# Patient Record
Sex: MALE | URBAN - METROPOLITAN AREA
[De-identification: names, ages, dates, MRNs, and addresses within clinical notes are randomized per-mention and may not be internally consistent; named-entity substitution may affect disease eponyms.]

---

## 2023-04-17 ENCOUNTER — PROCEDURE VISIT (OUTPATIENT)
Dept: SPORTS MEDICINE | Age: 18
End: 2023-04-17

## 2023-04-17 DIAGNOSIS — S93.402A MILD SPRAIN OF LEFT ANKLE, INITIAL ENCOUNTER: Primary | ICD-10-CM

## 2023-04-17 NOTE — PROGRESS NOTES
Irregular   Volume [x] Adequate  [] Shallow [] Deep  Effort  [] Labored [x] Unlabored  Skin:  Color  [x] Normal [] Pale [] Cyanotic    Temperature [] Hot   [x] Warm [] Cool  [] Cold     Moisture [] Dry  [x] Moist [] Warm    Psychiatric:   [x] Good judgement and insight. [x] Oriented to [x] person, [x] place, and [x] time. [x] Mood appropriate for circumstances. Gait & Station:   Gait:    [] Normal  [x] Antalgic  [] Trendelenburg  [] Steppage  [] Wide  [] Unsteady   Foot:   [x] Neutral  [] Pronated  [] Supinated  Foot Type:  [x] Neutral  [] Pes Planus  [] Pes Cavus  Assistive Device: [x] None  [] Brace  [] Cane  [] Crutches  [] Alda Downs  [] Wheelchair  [] Other:   Inspection:   Skin:   [x] Intact [] Abrasion  [] Laceration  Notes:   Ecchymosis:  [x] None [] Mild  [] Moderate  [] Severe  Notes:   Atrophy:  [x] None [] Mild  [] Moderate  [] Severe  Notes:   Effusion:  [] None [x] Mild  [] Moderate  [] Severe  Notes: around the lateral malleolus, especially over the ATFL. Deformity:  [x] None [] Mild  [] Moderate  [] Severe  Notes:   Scar / Surgical incision(s): [] A-Scope Portals  [] Open Surgical Incision(s)  Notes: None   Joint Hypertrophy:  Notes: None  Alignment:   [x] Alignment was not assessed   Normal Measured Findings/Notes Passively Correctable to Normal   Patella Q-Angle []  []   Valgus Alignment []  []   Varus Alignment []  []   Pelvis Alignment []  []   Leg Length []  []    []  []   Orthopaedic Exam: Left Ankle  Palpation:   Tenderness: [] None  [] Mild [] Moderate [] Severe   at: Anterior Talofibular Ligament and Deltoid Ligament  Crepitation: [x] None  [] Mild [] Moderate [] Severe   at: N/A  Effusion: [] None  [] Mild [x] Moderate [] Severe   at:  Anterior Talofibular Ligament  Posterior Pedal Pulse:  [] Not assessed [] Not Detected [x] Detected  Dorsalis Pedal Pulse: [] Not assessed [] Not Detected [x] Detected  Deformity:   Range of Motion: (Not assessed if not marked)  [x] Normal Flexibility /

## 2023-04-24 ENCOUNTER — PROCEDURE VISIT (OUTPATIENT)
Dept: SPORTS MEDICINE | Age: 18
End: 2023-04-24

## 2023-04-24 DIAGNOSIS — S93.402D MILD SPRAIN OF LEFT ANKLE, SUBSEQUENT ENCOUNTER: Primary | ICD-10-CM

## 2023-04-24 NOTE — PROGRESS NOTES
Athletic Training  Date of Report: 2023  Name: Daryl Kraus: [facility]  Sport: Soccer  : 2005  Age: 21 y.o. MRN: <[id number]>  Encounter:  [] New AT Eval     [x] Follow-Up Visit    [] Other:   SUBJECTIVE:  Reason for Visit:    Chief Complaint   Patient presents with    Ankle Pain     Doroteo Quan is a 21y.o. year old  male who presents today to continue therapeutic exercises for his left ankle. Nadira Leal has been pain free for the last 3 days. He notes that he has not tried running, lifting, or any other physical activity since I saw him Thursday. He admits he has not been completing his HEP regularly, although he has been working on AROM while seated. Nadira Leal has soccer practice Tuesday-Thursday this week, as well as an alumni game on Saturday. OBJECTIVE:   Physical Exam  Not point tender. No ecchymosis or swelling. A/PROM wnl without pain. RROM 5/5 without pain. Negative anterior drawer. ASSESSMENT:   Diagnosis Orders   1. Mild sprain of left ankle, subsequent encounter            Clinical Impression: Contusion of the deltoid ligament (resolved) and Inversion Ankle Sprain  Status: Limited Restrictions: No scrimmage/live play    PLAN:  Nadira Leal completed the following exercises:     4 Way ankle 3x10  Standing BAPS x8 each direction  SLB Rebounder 2x15  SL Calf Raises 2x15  Wall Jumps 2x10  SL Hops 2x10 each  Straight Line Running  Zig Zag Drill  Box Drill   Nadira Leal was able to successfully complete all exercises without issue. He does have some weakness with SL lateral hops and planting and cutting off the left foot. I have updated his HEP and encouraged him to complete it daily. Nadira Leal reports his practices mostly consist of scrimmage only. I do not feel he is quite ready for scrimmage. He is going to do his own agility workout at the field during practice time.  Nadira Leal will reach out via [employer] regarding what he was able to tolerate - specifically, how long it took for his ankle to

## 2023-04-27 ENCOUNTER — PROCEDURE VISIT (OUTPATIENT)
Dept: SPORTS MEDICINE | Age: 18
End: 2023-04-27

## 2023-04-27 DIAGNOSIS — S93.402D MILD SPRAIN OF LEFT ANKLE, SUBSEQUENT ENCOUNTER: Primary | ICD-10-CM

## 2023-04-27 NOTE — PROGRESS NOTES
Athletic Training  Date of Report: 2023  Name: Lendon Brunner: [facility]  Sport: Soccer  : 2005  Age: 21 y.o. MRN: <[id number]>  Encounter:  [] New AT Eval     [x] Follow-Up Visit    [] Other:   SUBJECTIVE:  Reason for Visit:    Chief Complaint   Patient presents with    Ankle Pain     Jimenez Monroy is a 21y.o. year old  male who presents today to continue therapeutic exercises for his left ankle. Enma Nava no longer experiences any ankle pain. He practiced yesterday for an hour without issue. He notes it was mostly scrimmage. He has not been completing his HEP daily, but notes he plans to start. Enma Nava has soccer practice this weekend, as well as an alumni game on Saturday. OBJECTIVE:   Physical Exam  No change. ASSESSMENT:   Diagnosis Orders   1. Mild sprain of left ankle, subsequent encounter              Clinical Impression: Contusion of the deltoid ligament (resolved) and Inversion Ankle Sprain resolving  Status: Limited Restrictions: No scrimmage/live play    PLAN:  Angel completed the following exercises:    4 Way ankle 3x10  Standing BAPS x8 each direction  SLB Rebounder 2x15  SL Calf Raises 2x15  Wall Jumps 2x10  SL Hops 2x10 each  180 Squat Jumps 2x10  Straight Line Running  Zig Zag Drill  Box Drill  Enma Nava was able to successfully complete all exercises without issue. Lateral hops no longer felt weak. I have updated his HEP and encouraged him to complete it daily, at least for the next 2 weeks. Cleared for full RTP as tolerated. Enma Nava is discharged from my care. Follow up in ATR prn if symptoms return. Follow-Up Care / Instructions:    Discharged: Yes  Electronically Signed By: Bess Rahman, ATR, LAT, ATC

## 2024-04-20 ENCOUNTER — PROCEDURE VISIT (OUTPATIENT)
Dept: SPORTS MEDICINE | Age: 19
End: 2024-04-20

## 2024-04-20 DIAGNOSIS — M25.571 ACUTE RIGHT ANKLE PAIN: Primary | ICD-10-CM

## 2024-04-20 NOTE — PROGRESS NOTES
Unlabored  Skin:  Color  [x] Normal [] Pale - when he first came in, but this resolved before he left [] Cyanotic    Temperature [] Hot   [x] Warm [] Cool  [] Cold     Moisture [] Dry  [x] Moist [] Warm    Psychiatric:   [x] Good judgement and insight.  [x] Oriented to [x] person, [x] place, and [x] time.  [x] Mood appropriate for circumstances.  Gait & Station:   Gait:    [] Normal  [x] Antalgic - unable to bear any weight  [] Trendelenburg  [] Steppage  [] Wide  [] Unsteady   Foot:   [x] Neutral  [] Pronated  [] Supinated  Foot Type:  [x] Neutral  [] Pes Planus  [] Pes Cavus  Assistive Device: [x] None  [] Brace  [] Cane  [] Crutches  [] Walker  [] Wheelchair  [] Other:   Inspection:   Skin:   [] Intact [x] Abrasion  [] Laceration  Notes: very small abrasion over lateral malleolus  Ecchymosis:  [x] None [] Mild  [] Moderate  [] Severe  Notes:   Atrophy:  [x] None [] Mild  [] Moderate  [] Severe  Notes:   Effusion:  [] None [x] Mild  [] Moderate  [] Severe  Notes: Over lateral malleolus  Deformity:  [x] None [] Mild  [] Moderate  [] Severe  Notes:   Scar / Surgical incision(s): [] A-Scope Portals  [] Open Surgical Incision(s)  Notes: None  Joint Hypertrophy:  Notes: None  Alignment:   [x] Alignment was not assessed   Normal Measured Findings/Notes Passively Correctable to Normal   Patella Q-Angle []  []   Valgus Alignment []  []   Varus Alignment []  []   Pelvis Alignment []  []   Leg Length []  []    []  []   Orthopaedic Exam: Right Ankle  Palpation:   Tenderness: [] None  [] Mild [] Moderate [x] Severe   at: Lateral Malleolus , Anterior Tibiofibular Ligament, and Posterior Talofibular Ligament  Crepitation: [x] None  [] Mild [] Moderate [] Severe   at: N/A  Effusion: [] None  [x] Mild [] Moderate [] Severe   at: Lateral Malleolus   Posterior Pedal Pulse:  [] Not assessed [] Not Detected [x] Detected  Dorsalis Pedal Pulse: [] Not assessed [] Not Detected [x] Detected  Deformity: None  Range of Motion: (Not

## 2024-04-22 ENCOUNTER — OFFICE VISIT (OUTPATIENT)
Dept: ORTHOPEDIC SURGERY | Age: 19
End: 2024-04-22
Payer: COMMERCIAL

## 2024-04-22 VITALS — BODY MASS INDEX: 21.14 KG/M2 | HEIGHT: 75 IN | WEIGHT: 170 LBS

## 2024-04-22 DIAGNOSIS — M25.571 ACUTE RIGHT ANKLE PAIN: Primary | ICD-10-CM

## 2024-04-22 PROCEDURE — G8420 CALC BMI NORM PARAMETERS: HCPCS | Performed by: EMERGENCY MEDICINE

## 2024-04-22 PROCEDURE — L4361 PNEUMA/VAC WALK BOOT PRE OTS: HCPCS | Performed by: EMERGENCY MEDICINE

## 2024-04-22 PROCEDURE — 99203 OFFICE O/P NEW LOW 30 MIN: CPT | Performed by: EMERGENCY MEDICINE

## 2024-04-22 PROCEDURE — G8427 DOCREV CUR MEDS BY ELIG CLIN: HCPCS | Performed by: EMERGENCY MEDICINE

## 2024-04-22 PROCEDURE — 1036F TOBACCO NON-USER: CPT | Performed by: EMERGENCY MEDICINE

## 2024-04-22 ASSESSMENT — ENCOUNTER SYMPTOMS
ABDOMINAL PAIN: 0
SHORTNESS OF BREATH: 0

## 2024-04-22 NOTE — ASSESSMENT & PLAN NOTE
Patient is seen and examined in the clinic today.  He is presenting with acute right ankle pain after an inversion injury while playing soccer 2 days ago.  He presents today on crutches, nonweightbearing.    On exam, he has tenderness to palpation over the ATFL with pain with anterior drawer and talar tilt but no significant laxity.  Limited range of motion when compared to the contralateral side.    X-ray negative for acute osseous abnormality.    I discussion with the patient regarding potential workup and treatment options.  Clinical presentation seems most likely consistent with acute ankle sprain.  I have low suspicion for high ankle sprain or syndesmotic injury.  Achilles tendon intact.  We discussed the option of adding on an MRI for definitive diagnosis.  However, patient declined.  Instead, we discussed high tide walking boot, weightbearing as tolerated.  Ice, Tylenol, ibuprofen for symptomatic management.  I would like for him to get out of the boot several times a day for gentle range of motion exercises.  I will see him back in 3 weeks.  At this time, I will likely get him into physical therapy.  If he has no improvement of his symptoms at the 3-week visit, I would encourage adding on an MRI.      In 3 weeks, this is the patient's last week here at [facility] prior to summer break.  I did encourage the patient to get a follow-up appointment with an orthopedist at home.

## 2024-04-22 NOTE — PROGRESS NOTES
NEW PATIENT VISIT  CC: Ankle Pain (RIGHT ANKLE)    Referring Provider: [facility] ATC    HPI:    Angel Marie is a 18 y.o. male who presents for evaluation of right ankle pain.  Patient was playing soccer on 4/20/2024 when he sustained an inversion injury to the right ankle.  He states that he picked up a lot of swelling and has had decreased range of motion.  He states that he was placed on crutches.  Pain is worse with ambulation and with weightbearing.  He reports multiple prior ankle sprains.  He is right foot dominant.  He denies any numbness or tingling.  No proximal tib-fib pain.  He reports that the pain is mostly located laterally.  He denies any other complaints.    History reviewed. No pertinent past medical history.    Social History  Plays club soccer at [facility]    Medications  No current outpatient medications on file.     No current facility-administered medications for this visit.       Allergies  No Known Allergies    Review of Systems:  Review of Systems   Constitutional:  Negative for activity change.   Respiratory:  Negative for shortness of breath.    Cardiovascular:  Negative for chest pain.   Gastrointestinal:  Negative for abdominal pain.   Musculoskeletal:  Positive for joint swelling (right ankle). Negative for myalgias.        Right ankle pain   Skin:  Negative for rash.   Allergic/Immunologic: Negative for immunocompromised state.   Neurological:  Negative for numbness.       Physical Examination:  General: Well appearing male, in no acute distress  Respiratory: Normal respiratory effort  Cardiovascular: No visual or palpable edema  Skin: no identified rashes, no induration, erythema or cyanosis  Neurologic: Light touch sensation is intact, no allodynia or hyperalgesia  Gait: Ambulatory on crutches, nonweightbearing on the right lower extremity  Extremities: No evidence of clubbing, cyanosis, tenosynovitis or nail pitting  MSK: Right ankle  Inspection/Palpation: Soft tissue

## 2024-05-08 ENCOUNTER — PROCEDURE VISIT (OUTPATIENT)
Dept: SPORTS MEDICINE | Age: 19
End: 2024-05-08

## 2024-05-08 DIAGNOSIS — M25.571 ACUTE RIGHT ANKLE PAIN: Primary | ICD-10-CM

## 2024-05-08 NOTE — PROGRESS NOTES
Athletic Training  Date of Report: 2024  Name: Angel Marie  School: [employer]  Sport: Soccer  : 2005  Age: 18 y.o.  MRN: [id number]  Encounter:  [] New AT Eval     [x] Follow-Up Visit    [] Other:   SUBJECTIVE:  Reason for Visit:    Chief Complaint   Patient presents with    Ankle Pain     Angel Marie is a 18 y.o. year old, male who presents today to begin therapeutic exercises for his right ankle. 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 He has been in a walking boot for just over 2 weeks. He has begun weaning out of the boot. He still wears it when walking to and from classes and when he knows he will be walking for prolonged amount of time, but he does not wear it around the dorm or to the dining mina.   Angel's pain has improved. It is currently 2/10. He does have some posterior ankle pain occasionally, as well as some pain over the ATF.   Angel has a follow up scheduled with Dr. Christian on Monday.    OBJECTIVE:   Physical Exam  Gait appears normal. Angel is not currently wearing his boot.   Swelling has resolved. Abrasion has resolved.   Mildly point tender over ATF and just anterior to the achilles  Effusion has resolved.       Range of Motion: (Not assessed if not marked)  [] Normal Flexibility / Mobility   ROM WNL PROM AROM OP Comments     L R L R L R    Plantarflexion [x]          Dorsiflexion []  Mild Pain  Mild Pain      Inversion [x]          Eversion []  WNL  Mild Pain      Knee Flexion [x]          Knee Extension [x]           []          Manual Muscle Test: (Not assessed if not marked)    MMT Left Right Comment   Dorsiflexion  5    Plantarflexion  5, Painful    Inversion  5    Eversion  5, Painful    Knee Flexion  5    Knee Extension  5            Everything else is unchanged     ASSESSMENT:   Diagnosis Orders   1. Acute right ankle pain          Clinical Impression: Inversion Ankle Sprain, possible lateral malleolus fx  Status: No Participation  Est.

## 2024-05-13 ENCOUNTER — OFFICE VISIT (OUTPATIENT)
Dept: ORTHOPEDIC SURGERY | Age: 19
End: 2024-05-13
Payer: COMMERCIAL

## 2024-05-13 DIAGNOSIS — M25.571 ACUTE RIGHT ANKLE PAIN: Primary | ICD-10-CM

## 2024-05-13 PROCEDURE — 1036F TOBACCO NON-USER: CPT | Performed by: EMERGENCY MEDICINE

## 2024-05-13 PROCEDURE — G8420 CALC BMI NORM PARAMETERS: HCPCS | Performed by: EMERGENCY MEDICINE

## 2024-05-13 PROCEDURE — 99213 OFFICE O/P EST LOW 20 MIN: CPT | Performed by: EMERGENCY MEDICINE

## 2024-05-13 PROCEDURE — G8428 CUR MEDS NOT DOCUMENT: HCPCS | Performed by: EMERGENCY MEDICINE

## 2024-05-13 NOTE — PROGRESS NOTES
FOLLOW UP VISIT    Chief Complaint   Patient presents with    Ankle Pain     right       HPI:    Angel Marie is a 18 y.o. male who presents for follow-up evaluation of right ankle injury.  Initial injury occurred on 4/20/2024 while playing soccer.       At the last visit on 4/22/2024, the patient was presenting with acute right ankle pain after an inversion injury.  X-rays were negative for acute pathology.  He was placed in a high tide walking boot.    Since the last visit, Angel Marie has noted improvement of his symptoms.  He reports minimal pain.  He states that he has been progressing out of the boot, wearing the boot only for long distance walking.  He states he only has minimal pain while out of the boot.  He reports improvement of his swelling.  He is very happy with his progression.    Review of Systems:  Review of Systems   Constitutional:  Negative for activity change.   Musculoskeletal:  Negative for joint swelling and myalgias.        Right ankle pain   Skin:  Negative for rash.   Allergic/Immunologic: Negative for immunocompromised state.   Neurological:  Negative for numbness.       Medical History  Patient's medications, allergies, past medical, surgical, social, and family histories were reviewed and updated as appropriate.    Physical Examination:  General: Well appearing male, in no acute distress  Respiratory: Normal respiratory effort  Cardiovascular: No visual or palpable edema  Skin: no identified rashes, no induration, erythema or cyanosis  Neurologic: Light touch sensation is intact, no allodynia or hyperalgesia  Gait: Normal gait and station  Extremities: No evidence of clubbing, cyanosis, tenosynovitis or nail pitting  MSK:  Right ankle  Inspection/Palpation: No swelling, tenderness to palpation over the ATFL, no tenderness to palpation over the lateral malleolus, no tenderness to palpation over the medial malleolus, no tenderness to palpation over the Achilles tendon, negative squeeze of the